# Patient Record
Sex: FEMALE | Race: BLACK OR AFRICAN AMERICAN | Employment: FULL TIME | URBAN - METROPOLITAN AREA
[De-identification: names, ages, dates, MRNs, and addresses within clinical notes are randomized per-mention and may not be internally consistent; named-entity substitution may affect disease eponyms.]

---

## 2019-04-29 ENCOUNTER — HOSPITAL ENCOUNTER (EMERGENCY)
Age: 44
Discharge: HOME | End: 2019-04-29
Attending: EMERGENCY MEDICINE
Payer: COMMERCIAL

## 2019-04-29 ENCOUNTER — APPOINTMENT (OUTPATIENT)
Dept: ULTRASOUND IMAGING | Age: 44
End: 2019-04-29
Attending: EMERGENCY MEDICINE
Payer: COMMERCIAL

## 2019-04-29 VITALS
TEMPERATURE: 98 F | HEIGHT: 66 IN | SYSTOLIC BLOOD PRESSURE: 137 MMHG | DIASTOLIC BLOOD PRESSURE: 75 MMHG | WEIGHT: 178 LBS | HEART RATE: 70 BPM | OXYGEN SATURATION: 100 % | RESPIRATION RATE: 16 BRPM | BODY MASS INDEX: 28.61 KG/M2

## 2019-04-29 DIAGNOSIS — D25.1 INTRAMURAL AND SUBSEROUS LEIOMYOMA OF UTERUS: Primary | ICD-10-CM

## 2019-04-29 DIAGNOSIS — N93.8 DUB (DYSFUNCTIONAL UTERINE BLEEDING): ICD-10-CM

## 2019-04-29 DIAGNOSIS — D25.2 INTRAMURAL AND SUBSEROUS LEIOMYOMA OF UTERUS: Primary | ICD-10-CM

## 2019-04-29 LAB
ALBUMIN SERPL-MCNC: 3.3 G/DL (ref 3.5–5)
ALBUMIN/GLOB SERPL: 0.7 {RATIO}
ALP SERPL-CCNC: 78 U/L (ref 50–136)
ALT SERPL-CCNC: 13 U/L (ref 12–65)
ANION GAP SERPL CALC-SCNC: 8 MMOL/L
AST SERPL-CCNC: 11 U/L (ref 15–37)
BACTERIA URNS QL MICRO: 0 /HPF
BASOPHILS # BLD: 0 K/UL (ref 0–0.2)
BASOPHILS NFR BLD: 0 % (ref 0–2)
BILIRUB SERPL-MCNC: 0.2 MG/DL (ref 0.2–1.1)
BUN SERPL-MCNC: 10 MG/DL (ref 6–23)
CALCIUM SERPL-MCNC: 9.3 MG/DL (ref 8.3–10.4)
CASTS URNS QL MICRO: ABNORMAL /LPF
CHLORIDE SERPL-SCNC: 105 MMOL/L (ref 98–107)
CO2 SERPL-SCNC: 27 MMOL/L (ref 21–32)
CREAT SERPL-MCNC: 0.95 MG/DL (ref 0.6–1)
DIFFERENTIAL METHOD BLD: ABNORMAL
EOSINOPHIL # BLD: 0 K/UL (ref 0–0.8)
EOSINOPHIL NFR BLD: 0 % (ref 0.5–7.8)
EPI CELLS #/AREA URNS HPF: ABNORMAL /HPF
ERYTHROCYTE [DISTWIDTH] IN BLOOD BY AUTOMATED COUNT: 14 % (ref 11.9–14.6)
GLOBULIN SER CALC-MCNC: 4.9 G/DL (ref 2.3–3.5)
GLUCOSE SERPL-MCNC: 85 MG/DL (ref 65–100)
HCG SERPL-ACNC: <1 MIU/ML (ref 0–6)
HCG UR QL: NEGATIVE
HCT VFR BLD AUTO: 32.5 % (ref 35.8–46.3)
HGB BLD-MCNC: 11.2 G/DL (ref 11.7–15.4)
IMM GRANULOCYTES # BLD AUTO: 0.1 K/UL (ref 0–0.5)
IMM GRANULOCYTES NFR BLD AUTO: 0 % (ref 0–5)
LYMPHOCYTES # BLD: 4 K/UL (ref 0.5–4.6)
LYMPHOCYTES NFR BLD: 30 % (ref 13–44)
MCH RBC QN AUTO: 32.7 PG (ref 26.1–32.9)
MCHC RBC AUTO-ENTMCNC: 34.5 G/DL (ref 31.4–35)
MCV RBC AUTO: 94.8 FL (ref 79.6–97.8)
MONOCYTES # BLD: 0.7 K/UL (ref 0.1–1.3)
MONOCYTES NFR BLD: 5 % (ref 4–12)
NEUTS SEG # BLD: 8.6 K/UL (ref 1.7–8.2)
NEUTS SEG NFR BLD: 64 % (ref 43–78)
NRBC # BLD: 0 K/UL (ref 0–0.2)
PLATELET # BLD AUTO: 391 K/UL (ref 150–450)
PMV BLD AUTO: 9.9 FL (ref 9.4–12.3)
POTASSIUM SERPL-SCNC: 3.8 MMOL/L (ref 3.5–5.1)
PROT SERPL-MCNC: 8.2 G/DL
RBC # BLD AUTO: 3.43 M/UL (ref 4.05–5.2)
RBC #/AREA URNS HPF: >100 /HPF
SODIUM SERPL-SCNC: 140 MMOL/L (ref 136–145)
TSH SERPL DL<=0.005 MIU/L-ACNC: 4.41 UIU/ML
WBC # BLD AUTO: 13.4 K/UL (ref 4.3–11.1)
WBC URNS QL MICRO: ABNORMAL /HPF

## 2019-04-29 PROCEDURE — 96374 THER/PROPH/DIAG INJ IV PUSH: CPT | Performed by: EMERGENCY MEDICINE

## 2019-04-29 PROCEDURE — 96361 HYDRATE IV INFUSION ADD-ON: CPT | Performed by: EMERGENCY MEDICINE

## 2019-04-29 PROCEDURE — 99284 EMERGENCY DEPT VISIT MOD MDM: CPT | Performed by: EMERGENCY MEDICINE

## 2019-04-29 PROCEDURE — 74011250636 HC RX REV CODE- 250/636: Performed by: EMERGENCY MEDICINE

## 2019-04-29 PROCEDURE — 81025 URINE PREGNANCY TEST: CPT

## 2019-04-29 PROCEDURE — 81003 URINALYSIS AUTO W/O SCOPE: CPT | Performed by: EMERGENCY MEDICINE

## 2019-04-29 PROCEDURE — 76830 TRANSVAGINAL US NON-OB: CPT

## 2019-04-29 PROCEDURE — 80053 COMPREHEN METABOLIC PANEL: CPT

## 2019-04-29 PROCEDURE — 84702 CHORIONIC GONADOTROPIN TEST: CPT

## 2019-04-29 PROCEDURE — 85025 COMPLETE CBC W/AUTO DIFF WBC: CPT

## 2019-04-29 PROCEDURE — 74011250637 HC RX REV CODE- 250/637: Performed by: EMERGENCY MEDICINE

## 2019-04-29 PROCEDURE — 81015 MICROSCOPIC EXAM OF URINE: CPT

## 2019-04-29 PROCEDURE — 84443 ASSAY THYROID STIM HORMONE: CPT

## 2019-04-29 RX ORDER — HYDROCODONE BITARTRATE AND ACETAMINOPHEN 5; 325 MG/1; MG/1
1 TABLET ORAL ONCE
Status: COMPLETED | OUTPATIENT
Start: 2019-04-29 | End: 2019-04-29

## 2019-04-29 RX ORDER — NORGESTIMATE AND ETHINYL ESTRADIOL 0.25-0.035
1 KIT ORAL DAILY
COMMUNITY
End: 2021-08-30

## 2019-04-29 RX ORDER — KETOROLAC TROMETHAMINE 30 MG/ML
30 INJECTION, SOLUTION INTRAMUSCULAR; INTRAVENOUS ONCE
Status: COMPLETED | OUTPATIENT
Start: 2019-04-29 | End: 2019-04-29

## 2019-04-29 RX ORDER — HYDROCODONE BITARTRATE AND IBUPROFEN 5; 200 MG/1; MG/1
1 TABLET ORAL
Qty: 15 TAB | Refills: 0 | Status: SHIPPED | OUTPATIENT
Start: 2019-04-29 | End: 2019-05-02

## 2019-04-29 RX ORDER — ESCITALOPRAM OXALATE 10 MG/1
20 TABLET ORAL
COMMUNITY
End: 2021-08-30

## 2019-04-29 RX ORDER — LEVOTHYROXINE SODIUM 50 UG/1
50 TABLET ORAL
COMMUNITY
End: 2021-08-30

## 2019-04-29 RX ADMIN — HYDROCODONE BITARTRATE AND ACETAMINOPHEN 1 TABLET: 5; 325 TABLET ORAL at 20:02

## 2019-04-29 RX ADMIN — SODIUM CHLORIDE 1000 ML: 900 INJECTION, SOLUTION INTRAVENOUS at 18:11

## 2019-04-29 RX ADMIN — KETOROLAC TROMETHAMINE 30 MG: 30 INJECTION, SOLUTION INTRAMUSCULAR at 18:13

## 2019-04-29 NOTE — ED PROVIDER NOTES
47-year-old female presenting for painful vaginal bleeding. Symptoms seemed to start about 2 weeks ago and been persistent daily. She's been seeing her doctor every other day trying different regimens such as Provera and an other birth control without relief. She describing lower pelvic pain that she feels like a ripping sensation that radiates down into her groin and into her back. She's had intermittent issues with dysfunctional uterine bleeding since her 35s. She's been told that it is likely become worse secondary to perimenopausal status. She's been seen by GYN and follows with them intermittently. They've done an endometrial biopsy which was normal but otherwise she's never had a pelvic ultrasound. She's been taking ibuprofen with some relief but no complete resolution. She was sent here today for further workup given her PCP is unable to see me get her symptoms under control. The history is provided by the patient. Vaginal Bleeding This is a recurrent problem. The current episode started more than 1 week ago. The problem occurs constantly. Pertinent negatives include no chest pain, no headaches and no shortness of breath. The symptoms are relieved by NSAIDs. She has tried nothing for the symptoms. The treatment provided no relief. Past Medical History:  
Diagnosis Date  Hypertension Noncompliant with meds  Other ill-defined conditions(179.92) graves dx, constipation  Psychiatric disorder Anxiety  Thyroid disease Past Surgical History:  
Procedure Laterality Date  BREAST SURGERY PROCEDURE UNLISTED Breast Reduction  HX CHOLECYSTECTOMY Family History:  
Problem Relation Age of Onset  Hypertension Father Social History Socioeconomic History  Marital status: SINGLE Spouse name: Not on file  Number of children: Not on file  Years of education: Not on file  Highest education level: Not on file Occupational History  Not on file Social Needs  Financial resource strain: Not on file  Food insecurity:  
  Worry: Not on file Inability: Not on file  Transportation needs:  
  Medical: Not on file Non-medical: Not on file Tobacco Use  Smoking status: Current Some Day Smoker Packs/day: 0.25 Years: 5.00 Pack years: 1.25  Smokeless tobacco: Never Used Substance and Sexual Activity  Alcohol use: No  
 Drug use: No  
 Sexual activity: Not Currently Partners: Male Birth control/protection: None Lifestyle  Physical activity:  
  Days per week: Not on file Minutes per session: Not on file  Stress: Not on file Relationships  Social connections:  
  Talks on phone: Not on file Gets together: Not on file Attends [employer] service: Not on file Active member of club or organization: Not on file Attends meetings of clubs or organizations: Not on file Relationship status: Not on file  Intimate partner violence:  
  Fear of current or ex partner: Not on file Emotionally abused: Not on file Physically abused: Not on file Forced sexual activity: Not on file Other Topics Concern  Not on file Social History Narrative  Not on file ALLERGIES: Patient has no known allergies. Review of Systems Respiratory: Negative for shortness of breath. Cardiovascular: Negative for chest pain. Genitourinary: Positive for vaginal bleeding. Neurological: Negative for headaches. All other systems reviewed and are negative. Vitals:  
 04/29/19 1629 BP: 145/74 Pulse: 84 Resp: 16 Temp: 98 °F (36.7 °C) SpO2: 100% Weight: 80.7 kg (178 lb) Height: 5' 6\" (1.676 m) Physical Exam  
Constitutional: She is oriented to person, place, and time. She appears well-developed and well-nourished. HENT:  
Head: Normocephalic and atraumatic. Eyes: Pupils are equal, round, and reactive to light. Conjunctivae are normal.  
Neck: Neck supple. Cardiovascular: Normal rate and regular rhythm. Pulmonary/Chest: Effort normal and breath sounds normal.  
Abdominal: Soft. Bowel sounds are normal.  
Musculoskeletal: Normal range of motion. Neurological: She is alert and oriented to person, place, and time. Skin: Skin is warm and dry. Nursing note and vitals reviewed. MDM Number of Diagnoses or Management Options DUB (dysfunctional uterine bleeding): Intramural and subserous leiomyoma of uterus:  
Diagnosis management comments: 57-year-old female presenting for likely dysfunctional uterine bleeding. We'll check CBC, CMP, TSH, and get a urine and a pelvic ultrasound. Patient's vital signs are unremarkable she appears clinically well. Amount and/or Complexity of Data Reviewed Clinical lab tests: ordered and reviewed Tests in the radiology section of CPT®: ordered and reviewed Tests in the medicine section of CPT®: ordered and reviewed Decide to obtain previous medical records or to obtain history from someone other than the patient: yes Independent visualization of images, tracings, or specimens: yes (Review the patient's ultrasound) Risk of Complications, Morbidity, and/or Mortality Presenting problems: moderate Diagnostic procedures: moderate Management options: moderate General comments: I personally reviewed the patient's vital signs, laboratory tests, and/or radiological findings. I discussed these findings with the patient and their significance. I answered all questions and gave the patient clear return precautions. The patient was discharged from the emergency department in stable condition Patient Progress Patient progress: improved ED Course as of Apr 29 2113 Mon Apr 29, 2019 2047 Review the patient's ultrasound she may have a fibroid uterus but otherwise I see no acute abnormality. Awaiting official interpretation by radiologist.  
 [JS] ED Course User Index [JS] Nida Krueger MD  
 
 
Procedures

## 2019-04-29 NOTE — LETTER
[facility] EMERGENCY DEPT 
[address] [place] [id number] 
[phone] Work/School Note Date: 4/29/2019 To Whom It May concern: 
 
Roger Wilson was seen and treated today in the emergency room by the following provider(s): 
Attending Provider: Payton Dunham MD.   
 
Roger Wilson may return to work on Tuesday 4/30/19 Sincerely, Elsa Barajas RN

## 2019-04-29 NOTE — ED NOTES
Pt requested something for pain stating that \"toradol doesn't do anything\". Notified dr. Js Carlos.

## 2019-04-29 NOTE — ED TRIAGE NOTES
Pt states she went to MD several days last week and her MD told her there is nothing else she can do to stop the vaginal bleeding. Pt is taking BCPs and she states \"I done took 4 ibuprofen to slow the bleeding down and it ain't do nothing. \" Pt states GYN cannot get her in until May 6.

## 2019-04-30 NOTE — DISCHARGE INSTRUCTIONS
Her ultrasound shows a small uterine fibroid. This is likely the origin of some ear pain. Use the prescribed medications for pain control in addition to the Toradol you getting. Continue to work with her primary care team but there is no indication for emergency surgery or signs of infection at this time.

## 2019-04-30 NOTE — ED NOTES
I have reviewed discharge instructions with the patient. The patient verbalized understanding. Patient left ED via Discharge Method: ambulatory to Home with daughter. Opportunity for questions and clarification provided. Patient given 1 scripts. To continue your aftercare when you leave the hospital, you may receive an automated call from our care team to check in on how you are doing. This is a free service and part of our promise to provide the best care and service to meet your aftercare needs.  If you have questions, or wish to unsubscribe from this service please call [phone]. Thank you for Choosing our [facility] Emergency Department.

## 2021-08-30 PROBLEM — N93.9 ABNORMAL UTERINE BLEEDING (AUB): Status: ACTIVE | Noted: 2019-05-20

## 2021-09-13 PROBLEM — N63.20 LEFT BREAST LUMP: Status: ACTIVE | Noted: 2021-09-13

## 2021-09-13 PROBLEM — Z01.419 ENCOUNTER FOR ANNUAL ROUTINE GYNECOLOGICAL EXAMINATION: Status: ACTIVE | Noted: 2021-09-13

## 2022-03-18 PROBLEM — N93.9 ABNORMAL UTERINE BLEEDING (AUB): Status: ACTIVE | Noted: 2019-05-20

## 2022-03-19 PROBLEM — N63.20 LEFT BREAST LUMP: Status: ACTIVE | Noted: 2021-09-13

## 2022-03-19 PROBLEM — Z01.419 ENCOUNTER FOR ANNUAL ROUTINE GYNECOLOGICAL EXAMINATION: Status: ACTIVE | Noted: 2021-09-13

## 2022-06-02 RX ORDER — LEVOTHYROXINE SODIUM 0.05 MG/1
TABLET ORAL
Qty: 30 TABLET | Refills: 0 | OUTPATIENT
Start: 2022-06-02

## 2022-09-06 RX ORDER — LINACLOTIDE 290 UG/1
CAPSULE, GELATIN COATED ORAL
Qty: 90 CAPSULE | Refills: 2 | OUTPATIENT
Start: 2022-09-06

## 2022-09-20 DIAGNOSIS — R73.09 OTHER ABNORMAL GLUCOSE: ICD-10-CM

## 2022-09-20 DIAGNOSIS — E03.9 ACQUIRED HYPOTHYROIDISM: Primary | ICD-10-CM

## 2022-09-20 DIAGNOSIS — E53.8 DEFICIENCY OF OTHER SPECIFIED B GROUP VITAMINS: ICD-10-CM

## 2022-09-20 DIAGNOSIS — R25.2 CRAMP AND SPASM: ICD-10-CM

## 2022-09-20 DIAGNOSIS — Z00.00 LABORATORY EXAMINATION ORDERED AS PART OF A COMPLETE PHYSICAL EXAMINATION: ICD-10-CM

## 2022-09-20 DIAGNOSIS — E55.9 VITAMIN D DEFICIENCY, UNSPECIFIED: ICD-10-CM

## 2022-11-04 ENCOUNTER — NURSE ONLY (OUTPATIENT)
Dept: FAMILY MEDICINE CLINIC | Facility: CLINIC | Age: 47
End: 2022-11-04
Payer: COMMERCIAL

## 2022-11-04 DIAGNOSIS — E03.9 ACQUIRED HYPOTHYROIDISM: ICD-10-CM

## 2022-11-04 DIAGNOSIS — Z00.00 LABORATORY EXAMINATION ORDERED AS PART OF A COMPLETE PHYSICAL EXAMINATION: ICD-10-CM

## 2022-11-04 DIAGNOSIS — R73.09 OTHER ABNORMAL GLUCOSE: ICD-10-CM

## 2022-11-04 DIAGNOSIS — R25.2 CRAMP AND SPASM: ICD-10-CM

## 2022-11-04 DIAGNOSIS — E53.8 DEFICIENCY OF OTHER SPECIFIED B GROUP VITAMINS: ICD-10-CM

## 2022-11-04 DIAGNOSIS — E55.9 VITAMIN D DEFICIENCY, UNSPECIFIED: ICD-10-CM

## 2022-11-04 LAB
25(OH)D3 SERPL-MCNC: 33.2 NG/ML (ref 30–100)
ALBUMIN SERPL-MCNC: 3.6 G/DL (ref 3.5–5)
ALBUMIN/GLOB SERPL: 0.9 {RATIO} (ref 0.4–1.6)
ALP SERPL-CCNC: 89 U/L (ref 50–136)
ALT SERPL-CCNC: 15 U/L (ref 12–65)
ANION GAP SERPL CALC-SCNC: 6 MMOL/L (ref 2–11)
AST SERPL-CCNC: 12 U/L (ref 15–37)
BASOPHILS # BLD: 0 K/UL (ref 0–0.2)
BASOPHILS NFR BLD: 0 % (ref 0–2)
BILIRUB SERPL-MCNC: 0.3 MG/DL (ref 0.2–1.1)
BILIRUBIN, URINE, POC: NEGATIVE
BLOOD URINE, POC: ABNORMAL
BUN SERPL-MCNC: 10 MG/DL (ref 6–23)
CALCIUM SERPL-MCNC: 9.6 MG/DL (ref 8.3–10.4)
CHLORIDE SERPL-SCNC: 106 MMOL/L (ref 101–110)
CHOLEST SERPL-MCNC: 177 MG/DL
CO2 SERPL-SCNC: 28 MMOL/L (ref 21–32)
CREAT SERPL-MCNC: 0.9 MG/DL (ref 0.6–1)
DIFFERENTIAL METHOD BLD: ABNORMAL
EOSINOPHIL # BLD: 0.2 K/UL (ref 0–0.8)
EOSINOPHIL NFR BLD: 2 % (ref 0.5–7.8)
ERYTHROCYTE [DISTWIDTH] IN BLOOD BY AUTOMATED COUNT: 14.6 % (ref 11.9–14.6)
EST. AVERAGE GLUCOSE BLD GHB EST-MCNC: 123 MG/DL
GLOBULIN SER CALC-MCNC: 4 G/DL (ref 2.8–4.5)
GLUCOSE SERPL-MCNC: 80 MG/DL (ref 65–100)
GLUCOSE URINE, POC: NEGATIVE
HBA1C MFR BLD: 5.9 % (ref 4.8–5.6)
HCT VFR BLD AUTO: 35.8 % (ref 35.8–46.3)
HDLC SERPL-MCNC: 46 MG/DL (ref 40–60)
HDLC SERPL: 3.8 {RATIO}
HGB BLD-MCNC: 11.9 G/DL (ref 11.7–15.4)
IMM GRANULOCYTES # BLD AUTO: 0 K/UL (ref 0–0.5)
IMM GRANULOCYTES NFR BLD AUTO: 0 % (ref 0–5)
KETONES, URINE, POC: NEGATIVE
LDLC SERPL CALC-MCNC: 109.8 MG/DL
LEUKOCYTE ESTERASE, URINE, POC: NEGATIVE
LYMPHOCYTES # BLD: 4.3 K/UL (ref 0.5–4.6)
LYMPHOCYTES NFR BLD: 40 % (ref 13–44)
MAGNESIUM SERPL-MCNC: 2 MG/DL (ref 1.8–2.4)
MCH RBC QN AUTO: 32.8 PG (ref 26.1–32.9)
MCHC RBC AUTO-ENTMCNC: 33.2 G/DL (ref 31.4–35)
MCV RBC AUTO: 98.6 FL (ref 82–102)
MONOCYTES # BLD: 0.6 K/UL (ref 0.1–1.3)
MONOCYTES NFR BLD: 6 % (ref 4–12)
NEUTS SEG # BLD: 5.4 K/UL (ref 1.7–8.2)
NEUTS SEG NFR BLD: 52 % (ref 43–78)
NITRITE, URINE, POC: NEGATIVE
NRBC # BLD: 0 K/UL (ref 0–0.2)
PH, URINE, POC: 6 (ref 4.6–8)
PLATELET # BLD AUTO: 368 K/UL (ref 150–450)
PMV BLD AUTO: 11.3 FL (ref 9.4–12.3)
POTASSIUM SERPL-SCNC: 3.7 MMOL/L (ref 3.5–5.1)
PROT SERPL-MCNC: 7.6 G/DL (ref 6.3–8.2)
PROTEIN,URINE, POC: NEGATIVE
RBC # BLD AUTO: 3.63 M/UL (ref 4.05–5.2)
SODIUM SERPL-SCNC: 140 MMOL/L (ref 133–143)
SPECIFIC GRAVITY, URINE, POC: 1.03 (ref 1–1.03)
TRIGL SERPL-MCNC: 106 MG/DL (ref 35–150)
TSH W FREE THYROID IF ABNORMAL: 6.67 UIU/ML (ref 0.36–3.74)
URINALYSIS CLARITY, POC: CLEAR
URINALYSIS COLOR, POC: YELLOW
UROBILINOGEN, POC: NORMAL
VIT B12 SERPL-MCNC: 539 PG/ML (ref 193–986)
VLDLC SERPL CALC-MCNC: 21.2 MG/DL (ref 6–23)
WBC # BLD AUTO: 10.5 K/UL (ref 4.3–11.1)

## 2022-11-04 PROCEDURE — 81002 URINALYSIS NONAUTO W/O SCOPE: CPT | Performed by: NURSE PRACTITIONER

## 2022-11-05 LAB — T4 FREE SERPL-MCNC: 0.8 NG/DL (ref 0.78–1.46)

## 2022-11-10 ENCOUNTER — OFFICE VISIT (OUTPATIENT)
Dept: FAMILY MEDICINE CLINIC | Facility: CLINIC | Age: 47
End: 2022-11-10
Payer: COMMERCIAL

## 2022-11-10 VITALS
HEART RATE: 86 BPM | BODY MASS INDEX: 26.81 KG/M2 | DIASTOLIC BLOOD PRESSURE: 70 MMHG | WEIGHT: 166.8 LBS | HEIGHT: 66 IN | RESPIRATION RATE: 18 BRPM | SYSTOLIC BLOOD PRESSURE: 128 MMHG | OXYGEN SATURATION: 98 %

## 2022-11-10 DIAGNOSIS — N95.1 MENOPAUSAL AND FEMALE CLIMACTERIC STATES: ICD-10-CM

## 2022-11-10 DIAGNOSIS — Z12.11 COLON CANCER SCREENING: ICD-10-CM

## 2022-11-10 DIAGNOSIS — Z72.0 TOBACCO USE: ICD-10-CM

## 2022-11-10 DIAGNOSIS — K59.04 CHRONIC IDIOPATHIC CONSTIPATION: ICD-10-CM

## 2022-11-10 DIAGNOSIS — E55.9 VITAMIN D DEFICIENCY, UNSPECIFIED: ICD-10-CM

## 2022-11-10 DIAGNOSIS — E03.9 ACQUIRED HYPOTHYROIDISM: ICD-10-CM

## 2022-11-10 DIAGNOSIS — F41.1 GENERALIZED ANXIETY DISORDER: ICD-10-CM

## 2022-11-10 DIAGNOSIS — Z00.00 ROUTINE GENERAL MEDICAL EXAMINATION AT A HEALTH CARE FACILITY: Primary | ICD-10-CM

## 2022-11-10 DIAGNOSIS — Z12.31 BREAST CANCER SCREENING BY MAMMOGRAM: ICD-10-CM

## 2022-11-10 DIAGNOSIS — R73.09 OTHER ABNORMAL GLUCOSE: ICD-10-CM

## 2022-11-10 DIAGNOSIS — J40 BRONCHITIS: ICD-10-CM

## 2022-11-10 PROBLEM — N93.9 ABNORMAL UTERINE BLEEDING (AUB): Status: ACTIVE | Noted: 2019-05-20

## 2022-11-10 PROCEDURE — 99396 PREV VISIT EST AGE 40-64: CPT | Performed by: NURSE PRACTITIONER

## 2022-11-10 PROCEDURE — 99214 OFFICE O/P EST MOD 30 MIN: CPT | Performed by: NURSE PRACTITIONER

## 2022-11-10 RX ORDER — LEVOTHYROXINE SODIUM 0.05 MG/1
TABLET ORAL
Qty: 90 TABLET | Refills: 1 | Status: SHIPPED | OUTPATIENT
Start: 2022-11-10

## 2022-11-10 RX ORDER — VENLAFAXINE HYDROCHLORIDE 37.5 MG/1
37.5 CAPSULE, EXTENDED RELEASE ORAL DAILY
Qty: 30 CAPSULE | Refills: 3 | Status: SHIPPED | OUTPATIENT
Start: 2022-11-10

## 2022-11-10 RX ORDER — AZITHROMYCIN 250 MG/1
TABLET, FILM COATED ORAL
Qty: 6 TABLET | Refills: 0 | Status: SHIPPED | OUTPATIENT
Start: 2022-11-10

## 2022-11-10 RX ORDER — HYDROXYZINE HYDROCHLORIDE 25 MG/1
TABLET, FILM COATED ORAL
Qty: 60 TABLET | Refills: 11 | Status: SHIPPED | OUTPATIENT
Start: 2022-11-10

## 2022-11-10 ASSESSMENT — ENCOUNTER SYMPTOMS
NAUSEA: 0
WHEEZING: 0
PHOTOPHOBIA: 0
CHOKING: 0
ABDOMINAL PAIN: 0
BLOOD IN STOOL: 0
ALLERGIC/IMMUNOLOGIC NEGATIVE: 1
CHEST TIGHTNESS: 0
SINUS PAIN: 0
EYE REDNESS: 0
STRIDOR: 0
EYE PAIN: 0
FACIAL SWELLING: 0
VOMITING: 0
COLOR CHANGE: 0
DIARRHEA: 0
EYE ITCHING: 0
ABDOMINAL DISTENTION: 0
TROUBLE SWALLOWING: 0
SINUS PRESSURE: 0
BACK PAIN: 0
EYES NEGATIVE: 1
EYE DISCHARGE: 0
APNEA: 0
SHORTNESS OF BREATH: 0
GASTROINTESTINAL NEGATIVE: 1
COUGH: 0
CONSTIPATION: 0
VOICE CHANGE: 0
RHINORRHEA: 0
ANAL BLEEDING: 0
SORE THROAT: 0
RESPIRATORY NEGATIVE: 1
RECTAL PAIN: 0

## 2022-11-10 NOTE — PROGRESS NOTES
PROGRESS NOTE    Chief Complaint   Patient presents with    Annual Exam     Pt is due for her annual medical physical.    Congestion     Pt endorses intermittent chest congestion and coughing. Currently smoking. Has quit in the past. Has sinus pressure for over one week. SUBJECTIVE:     Hilton Fatima is a very pleasant 50 y.o. female with hx of  anxiety, depression, insomnia, thyroid disease, chronic idiopathic constipation, vitamin B12 deficiency, and vitamin D deficiency , seen today in office for lab results review and medication refill. She is due for her annual medical physical.    Patient is currently living in [facility] with her mother. Works from home. She did restart back to smoking. She does have intermittent coughing and chest congestion. She reports symptoms present for over a week. No fever or chills. No known or recent sick contacts. She continues to experience hot flashes. She has been out of her thyroid medication for a few months and has noted her hot flashes seem to be worse. She has made significant changes to her dietary and lifestyle and has stopped red meat consumption completely. She continues to have constipation and is needing to have Linzess refilled. She is due for her annual mammogram breast cancer screening. She is also due for her colon cancer screening. She has not had her annual flu vaccine and is also due for her Tdap. Patient reports no chest pain, no shortness of breath, no unilateral or focal weakness, no orthopnea or PND. Past Medical History, Past Surgical History, Family history, Social History, and Medications were all reviewed with the patient today and updated as necessary.        Current Outpatient Medications   Medication Sig Dispense Refill    linaclotide (LINZESS) 290 MCG CAPS capsule Take 1 capsule by mouth every morning (before breakfast) 90 capsule 3    levothyroxine (SYNTHROID) 50 MCG tablet TAKE ONE TABLET BY MOUTH ONE TIME DAILY BEFORE BREAKFAST 90 tablet 1    hydrOXYzine HCl (ATARAX) 25 MG tablet Take 1-2 tabs orally at bedtime as needed for sleep 60 tablet 11    venlafaxine (EFFEXOR XR) 37.5 MG extended release capsule Take 1 capsule by mouth daily In the morning with food 30 capsule 3    azithromycin (ZITHROMAX) 250 MG tablet Take 2 Tablets with food by mouth first day, then 1 tab with food by mouth daily for days 2 through 5. 6 tablet 0    PAPAYA ENZYME PO Take 1 tablet by mouth 2 times daily      Cholecalciferol 50 MCG (2000 UT) CAPS Take 2,000 Units by mouth daily       No current facility-administered medications for this visit. No Known Allergies  Patient Active Problem List   Diagnosis    Abnormal uterine bleeding (AUB)    Left breast lump    Encounter for annual routine gynecological examination     Past Medical History:   Diagnosis Date    H/O radioactive iodine thyroid ablation     Hypertension     Noncompliant with meds    Other ill-defined conditions(799.89)     graves dx, constipation    Psychiatric disorder     Anxiety    Thyroid disease      Past Surgical History:   Procedure Laterality Date    BREAST SURGERY      Breast Reduction    CHOLECYSTECTOMY       Family History   Problem Relation Age of Onset    Uterine Cancer Neg Hx     Ovarian Cancer Neg Hx     Colon Cancer Neg Hx     Hypertension Father     Breast Cancer Neg Hx      Social History     Tobacco Use    Smoking status: Some Days     Packs/day: 0.25     Types: Cigarettes    Smokeless tobacco: Never   Substance Use Topics    Alcohol use: No         REVIEW OF SYSTEM    Review of Systems   Constitutional: Negative. Negative for activity change, appetite change, chills, diaphoresis, fatigue, fever and unexpected weight change. HENT: Negative.   Negative for congestion, dental problem, drooling, ear discharge, ear pain, facial swelling, hearing loss, mouth sores, nosebleeds, postnasal drip, rhinorrhea, sinus pressure, sinus pain, sneezing, sore throat, tinnitus, trouble swallowing and voice change. Eyes: Negative. Negative for photophobia, pain, discharge, redness, itching and visual disturbance. Respiratory: Negative. Negative for apnea, cough, choking, chest tightness, shortness of breath, wheezing and stridor. Cardiovascular: Negative. Negative for chest pain, palpitations and leg swelling. Gastrointestinal: Negative. Negative for abdominal distention, abdominal pain, anal bleeding, blood in stool, constipation, diarrhea, nausea, rectal pain and vomiting. Endocrine: Positive for cold intolerance and heat intolerance. Negative for polydipsia, polyphagia and polyuria. Hot flashes   Genitourinary: Negative. Negative for decreased urine volume, difficulty urinating, dyspareunia, dysuria, enuresis, flank pain, frequency, genital sores, hematuria, menstrual problem, pelvic pain, urgency, vaginal bleeding, vaginal discharge and vaginal pain. Musculoskeletal: Negative. Negative for arthralgias, back pain, gait problem, joint swelling, myalgias, neck pain and neck stiffness. Skin: Negative. Negative for color change, pallor, rash and wound. Allergic/Immunologic: Negative. Negative for environmental allergies, food allergies and immunocompromised state. Neurological: Negative. Negative for dizziness, tremors, seizures, syncope, facial asymmetry, speech difficulty, weakness, light-headedness, numbness and headaches. Hematological: Negative. Negative for adenopathy. Does not bruise/bleed easily. Psychiatric/Behavioral:  Positive for dysphoric mood and sleep disturbance. Negative for agitation, behavioral problems, confusion, decreased concentration, hallucinations, self-injury and suicidal ideas. The patient is nervous/anxious. The patient is not hyperactive.       OBJECTIVE:  /70 (Site: Left Upper Arm, Position: Sitting, Cuff Size: Medium Adult)   Pulse 86   Resp 18   Ht 5' 6\" (1.676 m)   Wt 166 lb 12.8 oz (75.7 kg)   LMP (LMP Unknown)   SpO2 98%   BMI 26.92 kg/m²      Physical Exam  Constitutional:       General: She is not in acute distress. Appearance: Normal appearance. She is not ill-appearing, toxic-appearing or diaphoretic. HENT:      Head: Normocephalic and atraumatic. Right Ear: Tympanic membrane, ear canal and external ear normal. There is no impacted cerumen. Left Ear: Tympanic membrane, ear canal and external ear normal. There is no impacted cerumen. Nose: Nose normal.      Mouth/Throat:      Mouth: Mucous membranes are moist.      Pharynx: Oropharynx is clear. Eyes:      Extraocular Movements: Extraocular movements intact. Conjunctiva/sclera: Conjunctivae normal.      Pupils: Pupils are equal, round, and reactive to light. Neck:      Vascular: No carotid bruit. Cardiovascular:      Rate and Rhythm: Normal rate and regular rhythm. Pulses: Normal pulses. Heart sounds: Normal heart sounds. No murmur heard. No friction rub. No gallop. Pulmonary:      Effort: Pulmonary effort is normal. No respiratory distress. Breath sounds: No stridor. Rhonchi present. No wheezing or rales. Chest:      Chest wall: No tenderness. Abdominal:      General: Abdomen is flat. Bowel sounds are normal. There is no distension. Palpations: Abdomen is soft. There is no shifting dullness, fluid wave, hepatomegaly, splenomegaly, mass or pulsatile mass. Tenderness: There is no abdominal tenderness. There is no right CVA tenderness, left CVA tenderness, guarding or rebound. Negative signs include Parnell's sign, Rovsing's sign, McBurney's sign, psoas sign and obturator sign. Hernia: No hernia is present. Genitourinary:     Comments: Deferred  Musculoskeletal:         General: Normal range of motion. Cervical back: Normal range of motion and neck supple. No rigidity or tenderness. Right lower leg: No edema. Left lower leg: No edema.    Lymphadenopathy:      Cervical: No cervical adenopathy. Skin:     General: Skin is warm and dry. Capillary Refill: Capillary refill takes less than 2 seconds. Neurological:      General: No focal deficit present. Mental Status: She is alert and oriented to person, place, and time. Psychiatric:         Mood and Affect: Mood normal.         Behavior: Behavior normal.         Thought Content: Thought content normal.         Judgment: Judgment normal.         Medical problems and test results were reviewed with the patient today.      Lab Results   Component Value Date     11/04/2022    K 3.7 11/04/2022     11/04/2022    CO2 28 11/04/2022    BUN 10 11/04/2022    CREATININE 0.90 11/04/2022    GLUCOSE 80 11/04/2022    CALCIUM 9.6 11/04/2022    PROT 7.6 11/04/2022    LABALBU 3.6 11/04/2022    BILITOT 0.3 11/04/2022    ALKPHOS 89 11/04/2022    AST 12 (L) 11/04/2022    ALT 15 11/04/2022    LABGLOM >60 11/04/2022    GFRAA 94 08/31/2021    AGRATIO 1.3 08/31/2021    GLOB 4.0 11/04/2022     Lab Results   Component Value Date    WBC 10.5 11/04/2022    HGB 11.9 11/04/2022    HCT 35.8 11/04/2022    MCV 98.6 11/04/2022     11/04/2022     Lab Results   Component Value Date    CHOL 177 11/04/2022    CHOL 197 08/31/2021     Lab Results   Component Value Date    TRIG 106 11/04/2022    TRIG 71 08/31/2021     Lab Results   Component Value Date    HDL 46 11/04/2022    HDL 43 08/31/2021     Lab Results   Component Value Date    LDLCALC 109.8 (H) 11/04/2022    LDLCALC 141 (H) 08/31/2021     Lab Results   Component Value Date    LABVLDL 21.2 11/04/2022    VLDL 13 08/31/2021     Lab Results   Component Value Date    CHOLHDLRATIO 3.8 11/04/2022     Lab Results   Component Value Date    TSH 6.580 (H) 08/31/2021    T4FREE 0.8 11/04/2022     Lab Results   Component Value Date/Time    VITD25 33.2 11/04/2022 10:22 AM      Hemoglobin A1C   Date Value Ref Range Status   11/04/2022 5.9 (H) 4.8 - 5.6 % Final     Lab Results   Component Value Date [id number] 539 11/04/2022       ASSESSMENT and PLAN    1. Routine general medical examination at a health care facility   . Anticipatory guidance for age-seatbelts, avoid cell phone use in car (may use hands free), no texting while driving, avoid social drugs and tobacco, limit alcohol, fall safety, personal safety with appropriate use of helmets and equipment for protection, and appropriate use of sun screen and sun protection to prevent skin cancer. Encourage healthy diet and exercise daily. 2. Breast cancer screening by mammogram  -     LISETH DIGITAL SCREEN W OR WO CAD BILATERAL; Future    Patient is due for annual breast cancer screening mammogram.  She currently lives in [facility] would like to have order sent to [facility] radiology. Order faxed. Copy of mammogram order also given to patient to take with her. 3. Colon cancer screening  -     [facility]    Patient is due for colon cancer screening due to new guideline changes recommendation. Discussed screening option including FOBT, Cologuard or colonoscopy. Patient is amenable to having colonoscopy completed. Referral placed. 4. Bronchitis  -     azithromycin (ZITHROMAX) 250 MG tablet; Take 2 Tablets with food by mouth first day, then 1 tab with food by mouth daily for days 2 through 5., Disp-6 tablet, R-0Normal    As symptom has been present for over a week, will proceed to treat for bacterial bronchitis. Start Z-Johnny. Reviewed risk and side effects of medication including GI upset and increased risk for opportunistic infection such as yeast and C. difficile. Patient was advised to take medication with food and to increase probiotic supplementation (via yogurt, cottage cheese, cultured foods/drinks or OTC probiotic supplements) to avoid any opportunistic infection.     5. Acquired hypothyroidism  -     levothyroxine (SYNTHROID) 50 MCG tablet; TAKE ONE TABLET BY MOUTH ONE TIME DAILY BEFORE BREAKFAST, Disp-90 tablet, R-1Normal    TSH is 6.99. Free T4 is normal but on low end. Patient endorses increase in heat/cold intolerance and hot flashes. We will have patient restart her Synthroid 50 MCG daily. Repeat labs in 1 to 3 months. 6. Other abnormal glucose   A1c is 5.9. No new medication at this time. Patient to continue with healthy diet low in carbohydrate, low-fat, low-cholesterol and increasing her exercise regimen. Repeat labs in 3 to 6 months. 7. Vitamin D deficiency, unspecified   Normal.  Continue current supplementation. 8. Generalized anxiety disorder  -     hydrOXYzine HCl (ATARAX) 25 MG tablet; Take 1-2 tabs orally at bedtime as needed for sleep, Disp-60 tablet, R-11Normal  -     venlafaxine (EFFEXOR XR) 37.5 MG extended release capsule; Take 1 capsule by mouth daily In the morning with food, Disp-30 capsule, R-3Normal    Patient report trying Zoloft and Wellbutrin in the past but did not tolerate medication well. She has not tried a Effexor in the past.  We will have patient start low-dose for dual benefits of menopausal symptom management and anxiety management. Reviewed and discussed potential side effects of medication to include GI upset, decreased orgasm, and worsening of depression or anxiety, suicidal or homicidal thoughts or manic symptoms. Patient was advised to stop the medication immediately and seek help if there are any worsening symptoms of depression and anxiety or developments of suicidal or homicidal thoughts. We will also restart hydroxyzine as needed for bedtime as needed for sleep. Sedation and safety precaution reiterated. 9. Menopausal and female climacteric states  -     venlafaxine (EFFEXOR XR) 37.5 MG extended release capsule; Take 1 capsule by mouth daily In the morning with food, Disp-30 capsule, R-3Normal      10. Chronic idiopathic constipation  -     linaclotide (LINZESS) 290 MCG CAPS capsule;  Take 1 capsule by mouth every morning (before breakfast), Disp-90 capsule, R-3Normal    Patient has tried multiple medication in the past and finds that [address] worked the best.  We will restart Rx. 11. Tobacco use   Advised patient to quit and offered support. Tried wellbutrin in the past but did not like side effects. Orders Placed This Encounter   Procedures    LISETH DIGITAL SCREEN W OR WO CAD BILATERAL     Standing Status:   Future     Standing Expiration Date:   1/10/2024    TSH with Reflex     Standing Status:   Future     Standing Expiration Date:   11/10/2023    Amb External Referral To Hepatology     Referral Priority:   Routine     Referral Reason:   Specialty Services Required     [facility]   [facility]     Requested Specialty:   Gastroenterology     Number of Visits Requested:   1         Elements of this note have been dictated using speech recognition software. As a result, errors of speech recognition may have occurred. On this date 11/10/2022 I have spent 30 minutes reviewing previous notes, test results and face to face with the patient discussing the diagnosis and importance of compliance with the treatment plan as well as documenting on the day of the visit. Greater than 50% of this visit was spent counseling the patient about test results, prognosis, importance of compliance, education about disease process, benefits of medications, instructions for management of acute symptoms, and follow up plans. Return in about 2 months (around 1/10/2023) for follow up ok to be virtual with labs prior (TSH) with Tdap vaccine.      RICH Soto - CNP

## 2022-12-20 ENCOUNTER — TELEMEDICINE (OUTPATIENT)
Dept: FAMILY MEDICINE CLINIC | Facility: CLINIC | Age: 47
End: 2022-12-20
Payer: COMMERCIAL

## 2022-12-20 DIAGNOSIS — R05.1 ACUTE COUGH: Primary | ICD-10-CM

## 2022-12-20 DIAGNOSIS — B96.89 ACUTE BACTERIAL BRONCHITIS: ICD-10-CM

## 2022-12-20 DIAGNOSIS — J20.8 ACUTE BACTERIAL BRONCHITIS: ICD-10-CM

## 2022-12-20 DIAGNOSIS — R09.89 CHEST CONGESTION: ICD-10-CM

## 2022-12-20 PROCEDURE — 99442 PR PHYS/QHP TELEPHONE EVALUATION 11-20 MIN: CPT | Performed by: FAMILY MEDICINE

## 2022-12-20 RX ORDER — DOXYCYCLINE 100 MG/1
100 TABLET ORAL 2 TIMES DAILY
Qty: 14 TABLET | Refills: 0 | Status: SHIPPED | OUTPATIENT
Start: 2022-12-20 | End: 2022-12-27

## 2022-12-20 ASSESSMENT — PATIENT HEALTH QUESTIONNAIRE - PHQ9
SUM OF ALL RESPONSES TO PHQ9 QUESTIONS 1 & 2: 0
2. FEELING DOWN, DEPRESSED OR HOPELESS: 0
4. FEELING TIRED OR HAVING LITTLE ENERGY: 0
SUM OF ALL RESPONSES TO PHQ QUESTIONS 1-9: 0
5. POOR APPETITE OR OVEREATING: 0
6. FEELING BAD ABOUT YOURSELF - OR THAT YOU ARE A FAILURE OR HAVE LET YOURSELF OR YOUR FAMILY DOWN: 0
SUM OF ALL RESPONSES TO PHQ QUESTIONS 1-9: 0
1. LITTLE INTEREST OR PLEASURE IN DOING THINGS: 0
SUM OF ALL RESPONSES TO PHQ QUESTIONS 1-9: 0
3. TROUBLE FALLING OR STAYING ASLEEP: 0
10. IF YOU CHECKED OFF ANY PROBLEMS, HOW DIFFICULT HAVE THESE PROBLEMS MADE IT FOR YOU TO DO YOUR WORK, TAKE CARE OF THINGS AT HOME, OR GET ALONG WITH OTHER PEOPLE: 0
7. TROUBLE CONCENTRATING ON THINGS, SUCH AS READING THE NEWSPAPER OR WATCHING TELEVISION: 0
8. MOVING OR SPEAKING SO SLOWLY THAT OTHER PEOPLE COULD HAVE NOTICED. OR THE OPPOSITE, BEING SO FIGETY OR RESTLESS THAT YOU HAVE BEEN MOVING AROUND A LOT MORE THAN USUAL: 0
SUM OF ALL RESPONSES TO PHQ QUESTIONS 1-9: 0
9. THOUGHTS THAT YOU WOULD BE BETTER OFF DEAD, OR OF HURTING YOURSELF: 0

## 2022-12-20 ASSESSMENT — ANXIETY QUESTIONNAIRES
3. WORRYING TOO MUCH ABOUT DIFFERENT THINGS: 0
IF YOU CHECKED OFF ANY PROBLEMS ON THIS QUESTIONNAIRE, HOW DIFFICULT HAVE THESE PROBLEMS MADE IT FOR YOU TO DO YOUR WORK, TAKE CARE OF THINGS AT HOME, OR GET ALONG WITH OTHER PEOPLE: NOT DIFFICULT AT ALL
5. BEING SO RESTLESS THAT IT IS HARD TO SIT STILL: 0
GAD7 TOTAL SCORE: 0
1. FEELING NERVOUS, ANXIOUS, OR ON EDGE: 0
2. NOT BEING ABLE TO STOP OR CONTROL WORRYING: 0
4. TROUBLE RELAXING: 0
7. FEELING AFRAID AS IF SOMETHING AWFUL MIGHT HAPPEN: 0
6. BECOMING EASILY ANNOYED OR IRRITABLE: 0

## 2022-12-20 NOTE — PROGRESS NOTES
SUBJECTIVE:   Fly Mcadams is a 50 y.o. female who has a past medical history significant for anxiety, depression, insomnia, thyroid disease, chronic idiopathic constipation, vitamin B12 deficiency, and vitamin D deficiency. Patient presents today for virtual visit via telephone encounter. She reports that she has a productive cough, chest congestion and feeling poorly for about 3 days. No chest pain or shortness of breath reported. Home COVID test was negative. Patient's vital signs were not performed as encounter was performed virtually. Location of virtual visit was patient's home. HPI  See above    Past Medical History, Past Surgical History, Family history, Social History, and Medications were all reviewed with the patient today and updated as necessary. Current Outpatient Medications   Medication Sig Dispense Refill    linaclotide (LINZESS) 290 MCG CAPS capsule Take 1 capsule by mouth every morning (before breakfast) 90 capsule 3    levothyroxine (SYNTHROID) 50 MCG tablet TAKE ONE TABLET BY MOUTH ONE TIME DAILY BEFORE BREAKFAST 90 tablet 1    hydrOXYzine HCl (ATARAX) 25 MG tablet Take 1-2 tabs orally at bedtime as needed for sleep 60 tablet 11    venlafaxine (EFFEXOR XR) 37.5 MG extended release capsule Take 1 capsule by mouth daily In the morning with food 30 capsule 3    PAPAYA ENZYME PO Take 1 tablet by mouth 2 times daily      Cholecalciferol 50 MCG (2000 UT) CAPS Take 2,000 Units by mouth daily      azithromycin (ZITHROMAX) 250 MG tablet Take 2 Tablets with food by mouth first day, then 1 tab with food by mouth daily for days 2 through 5. (Patient not taking: Reported on 12/20/2022) 6 tablet 0     No current facility-administered medications for this visit.      No Known Allergies  Patient Active Problem List   Diagnosis    Abnormal uterine bleeding (AUB)    Left breast lump    Encounter for annual routine gynecological examination     Past Medical History:   Diagnosis Date    H/O radioactive iodine thyroid ablation     Hypertension     Noncompliant with meds    Other ill-defined conditions(799.89)     graves dx, constipation    Psychiatric disorder     Anxiety    Thyroid disease      Past Surgical History:   Procedure Laterality Date    BREAST SURGERY      Breast Reduction    CHOLECYSTECTOMY       Family History   Problem Relation Age of Onset    Uterine Cancer Neg Hx     Ovarian Cancer Neg Hx     Colon Cancer Neg Hx     Hypertension Father     Breast Cancer Neg Hx      Social History     Tobacco Use    Smoking status: Some Days     Packs/day: 0.25     Types: Cigarettes    Smokeless tobacco: Never   Substance Use Topics    Alcohol use: No         Review of Systems  See above    OBJECTIVE:  There were no vitals taken for this visit. Physical Exam    Medical problems and test results were reviewed with the patient today. ASSESSMENT and PLAN    1. Cough. Over-the-counter Delsym as needed. 2.  Chest congestion. Over-the-counter plain Mucinex as needed. 3.  Bronchitis. Doxycycline 100 mg twice a day for a week. Return if symptoms persist or worsen. Consent: This patient and/or their healthcare decision maker is aware that this patient-initiated Telehealth encounter is a billable service, with coverage as determined by their insurance carrier. Patient is aware that they may receive a bill and has provided verbal consent to proceed: Yes    I was in the officewhile conducting this encounter. Patient was at home. This virtual visit was conducted telephone encounter only. -  I affirm this is a Patient Initiated Episode with an Established Patient who has not had a related appointment within my department in the past 7 days or scheduled within the next 24 hours. Note: this encounter is not billable if this call serves to triage the patient into an appointment for the relevant concern.       On this date 12/20/2022 I have spent 15 minutes reviewing previous notes, test results and face to face with the patient discussing the diagnosis and importance of compliance with the treatment plan as well as documenting on the day of the visit. Greater than 50% of this visit was spent counseling the patient about test results, prognosis, importance of compliance, education about disease process, benefits of medications, instructions for management of acute symptoms, and follow up plans.

## 2022-12-21 ENCOUNTER — TELEPHONE (OUTPATIENT)
Dept: FAMILY MEDICINE CLINIC | Facility: CLINIC | Age: 47
End: 2022-12-21

## 2022-12-21 NOTE — TELEPHONE ENCOUNTER
PROVIDER FEEDBACK LOOP NO SHOW     Patient:Vaughn Paez  : 1975  Referring Provider: Lindsey Blanton  Referral Type:  Imaging     Procedures:  [id number] - LISETH DIGITAL SCREEN W OR WO CAD BILATERAL  Date Service Ordered 11/10/2022        Lio Paez did not show for their scheduled test ordered by your office. Please call your patient to explain significance of ordered test and direct patient to call Central Scheduling to schedule test at their earliest convenience. Please complete one of the following actions from Quick Actions buttons:     Route to Provider:  Route message to ordering provider to seek next steps in care plan. Telephone Encounter:  Telephone encounter will open. Call patient to explain significance of ordered test and direct patient to call Central Scheduling to schedule test then Document details of call. Open Referral:  Review referral notes or details if needed. Close Referral:  Referral will open. Document in Notes section of referral why the referral is being closed. Examples of referral closure:  Patient had test done outside of [facility] (list location), Patient refuses test, Patient no longer having symptoms, Unable to reach patient. Only close the referral if you are sure the test will not proceed.      Thank you     Central Scheduling